# Patient Record
Sex: MALE
[De-identification: names, ages, dates, MRNs, and addresses within clinical notes are randomized per-mention and may not be internally consistent; named-entity substitution may affect disease eponyms.]

---

## 2022-04-28 ENCOUNTER — APPOINTMENT (OUTPATIENT)
Dept: ORTHOPEDIC SURGERY | Facility: CLINIC | Age: 67
End: 2022-04-28
Payer: MEDICARE

## 2022-04-28 VITALS — BODY MASS INDEX: 28.61 KG/M2 | HEIGHT: 66 IN | WEIGHT: 178 LBS

## 2022-04-28 DIAGNOSIS — M17.11 UNILATERAL PRIMARY OSTEOARTHRITIS, RIGHT KNEE: ICD-10-CM

## 2022-04-28 DIAGNOSIS — M17.12 UNILATERAL PRIMARY OSTEOARTHRITIS, LEFT KNEE: ICD-10-CM

## 2022-04-28 PROBLEM — Z00.00 ENCOUNTER FOR PREVENTIVE HEALTH EXAMINATION: Status: ACTIVE | Noted: 2022-04-28

## 2022-04-28 PROCEDURE — 99213 OFFICE O/P EST LOW 20 MIN: CPT | Mod: 25

## 2022-04-28 PROCEDURE — 20610 DRAIN/INJ JOINT/BURSA W/O US: CPT | Mod: 50

## 2022-04-28 PROCEDURE — 73562 X-RAY EXAM OF KNEE 3: CPT | Mod: RT

## 2022-04-28 PROCEDURE — 99203 OFFICE O/P NEW LOW 30 MIN: CPT | Mod: 25

## 2022-04-28 NOTE — HISTORY OF PRESENT ILLNESS
[Gradual] : gradual [Dull/Aching] : dull/aching [Constant] : constant [Sleep] : sleep [de-identified] : Has soreness and stiffness both knees for long period of time. Gets some clicking, no locking or giving way. No joint swelling. Has h/o renal failure on dialysis [] : no [FreeTextEntry3] : 4/25/22 [FreeTextEntry5] : Patient started to feel really bad pain in the knees that has been getting worse [FreeTextEntry9] : hemp oil

## 2022-04-28 NOTE — PHYSICAL EXAM
[5___] : hamstring 5[unfilled]/5 [Negative] : negative Shaan's [Bilateral] : knee bilaterally [AP] : anteroposterior [Lateral] : lateral [Degenerative change] : Degenerative change [] : no effusion [FreeTextEntry9] : mild medial [TWNoteComboBox7] : flexion 120 degrees [de-identified] : extension 3 degrees

## 2022-04-28 NOTE — PROCEDURE
[Large Joint Injection] : Large joint injection [Bilateral] : bilaterally of the [Knee] : knee [Pain] : pain [Alcohol] : alcohol [Betadine] : betadine [Ethyl Chloride sprayed topically] : ethyl chloride sprayed topically [___ cc    3mg] :  Betamethasone ([place]) ~Vcc of 3mg [___ cc    1%] : Lidocaine ~Vcc of 1%

## 2024-04-09 ENCOUNTER — OFFICE (OUTPATIENT)
Dept: URBAN - METROPOLITAN AREA CLINIC 6 | Facility: CLINIC | Age: 69
Setting detail: OPHTHALMOLOGY
End: 2024-04-09
Payer: MEDICARE

## 2024-04-09 DIAGNOSIS — H43.813: ICD-10-CM

## 2024-04-09 DIAGNOSIS — H25.13: ICD-10-CM

## 2024-04-09 PROBLEM — H52.7 REFRACTIVE ERROR: Status: ACTIVE | Noted: 2024-04-09

## 2024-04-09 PROCEDURE — 92004 COMPRE OPH EXAM NEW PT 1/>: CPT | Performed by: OPHTHALMOLOGY

## 2025-04-22 ENCOUNTER — OFFICE (OUTPATIENT)
Dept: URBAN - METROPOLITAN AREA CLINIC 6 | Facility: CLINIC | Age: 70
Setting detail: OPHTHALMOLOGY
End: 2025-04-22
Payer: MEDICARE

## 2025-04-22 DIAGNOSIS — H43.813: ICD-10-CM

## 2025-04-22 DIAGNOSIS — H25.13: ICD-10-CM

## 2025-04-22 PROCEDURE — 92014 COMPRE OPH EXAM EST PT 1/>: CPT | Performed by: OPHTHALMOLOGY

## 2025-04-22 ASSESSMENT — KERATOMETRY
OD_AXISANGLE_DEGREES: 003
OS_K1POWER_DIOPTERS: 42.50
OD_K1POWER_DIOPTERS: 42.50
OS_K2POWER_DIOPTERS: 44.75
OS_AXISANGLE_DEGREES: 176
OD_K2POWER_DIOPTERS: 44.50

## 2025-04-22 ASSESSMENT — REFRACTION_MANIFEST
OD_CYLINDER: -2.25
OS_AXIS: 080
OS_VA1: 20/30-
OD_VA1: 20/25-2
OS_SPHERE: +3.25
OS_CYLINDER: -1.75
OD_SPHERE: +3.00
OU_VA: 20/25-
OD_AXIS: 095
OS_CYLINDER: -1.75
OU_VA: 20/25-
OD_ADD: +3.00
OS_VA1: 20/30-
OD_AXIS: 095
OD_SPHERE: +3.00
OD_CYLINDER: -2.25
OD_VA1: 20/25-2
OS_SPHERE: +3.25
OS_AXIS: 080
OS_ADD: +3.00

## 2025-04-22 ASSESSMENT — REFRACTION_CURRENTRX
OD_CYLINDER: -1.50
OS_CYLINDER: -2.25
OD_VPRISM_DIRECTION: PROGS
OS_ADD: +3.00
OS_OVR_VA: 20/
OS_VPRISM_DIRECTION: PROGS
OD_OVR_VA: 20/
OS_SPHERE: +3.25
OD_ADD: +3.00
OS_AXIS: 063
OD_SPHERE: +2.75
OD_AXIS: 092

## 2025-04-22 ASSESSMENT — REFRACTION_AUTOREFRACTION
OD_AXIS: 093
OS_SPHERE: +3.50
OS_AXIS: 077
OD_SPHERE: +2.75
OS_CYLINDER: -2.00
OD_CYLINDER: -1.75

## 2025-04-22 ASSESSMENT — VISUAL ACUITY
OS_BCVA: 20/30-1
OD_BCVA: 20/40-1

## 2025-04-22 ASSESSMENT — TONOMETRY
OS_IOP_MMHG: 19
OD_IOP_MMHG: 18

## 2025-04-29 ENCOUNTER — OFFICE (OUTPATIENT)
Dept: URBAN - METROPOLITAN AREA CLINIC 6 | Facility: CLINIC | Age: 70
Setting detail: OPHTHALMOLOGY
End: 2025-04-29
Payer: MEDICARE

## 2025-04-29 DIAGNOSIS — H25.011: ICD-10-CM

## 2025-04-29 DIAGNOSIS — H43.393: ICD-10-CM

## 2025-04-29 DIAGNOSIS — H35.371: ICD-10-CM

## 2025-04-29 DIAGNOSIS — H25.13: ICD-10-CM

## 2025-04-29 DIAGNOSIS — H43.813: ICD-10-CM

## 2025-04-29 PROCEDURE — 99214 OFFICE O/P EST MOD 30 MIN: CPT | Performed by: OPHTHALMOLOGY

## 2025-04-29 PROCEDURE — 92134 CPTRZ OPH DX IMG PST SGM RTA: CPT | Performed by: OPHTHALMOLOGY

## 2025-04-29 PROCEDURE — 92201 OPSCPY EXTND RTA DRAW UNI/BI: CPT | Performed by: OPHTHALMOLOGY

## 2025-04-29 ASSESSMENT — REFRACTION_CURRENTRX
OD_VPRISM_DIRECTION: PROGS
OD_AXIS: 087
OS_SPHERE: +2.50
OD_ADD: +3.00
OD_CYLINDER: -1.50
OS_ADD: +3.00
OS_AXIS: 084
OS_OVR_VA: 20/
OS_CYLINDER: -1.75
OD_OVR_VA: 20/
OD_SPHERE: +3.00
OS_VPRISM_DIRECTION: PROGS

## 2025-04-29 ASSESSMENT — REFRACTION_AUTOREFRACTION
OD_CYLINDER: -2.00
OS_SPHERE: +3.50
OD_SPHERE: +2.75
OS_CYLINDER: -2.25
OD_AXIS: 092
OS_AXIS: 076

## 2025-04-29 ASSESSMENT — KERATOMETRY
OD_AXISANGLE_DEGREES: 004
OS_K1POWER_DIOPTERS: 43.25
OD_K1POWER_DIOPTERS: 42.75
OD_K2POWER_DIOPTERS: 44.75
OS_K2POWER_DIOPTERS: 44.50
OS_AXISANGLE_DEGREES: 178

## 2025-04-29 ASSESSMENT — CONFRONTATIONAL VISUAL FIELD TEST (CVF)
OS_FINDINGS: FULL
OD_FINDINGS: FULL

## 2025-04-29 ASSESSMENT — REFRACTION_MANIFEST
OS_SPHERE: +3.50
OS_AXIS: 080
OD_AXIS: 090
OD_CYLINDER: -2.00
OS_SPHERE: +3.25
OD_SPHERE: +2.75
OD_VA1: 20/30
OD_AXIS: 095
OS_AXIS: 075
OS_CYLINDER: -1.75
OU_VA: 20/30
OD_SPHERE: +3.00
OS_ADD: +3.00
OS_VA1: 20/30-
OS_VA1: 20/40
OD_VA1: 20/25-2
OD_ADD: +3.00
OD_CYLINDER: -2.25
OS_CYLINDER: -2.25
OU_VA: 20/25-

## 2025-04-29 ASSESSMENT — TONOMETRY
OS_IOP_MMHG: 16
OD_IOP_MMHG: 19

## 2025-04-29 ASSESSMENT — VISUAL ACUITY
OS_BCVA: 20/50+2
OD_BCVA: 20/40

## 2025-06-14 ENCOUNTER — OFFICE (OUTPATIENT)
Dept: URBAN - METROPOLITAN AREA CLINIC 104 | Facility: CLINIC | Age: 70
Setting detail: OPHTHALMOLOGY
End: 2025-06-14
Payer: MEDICARE

## 2025-06-14 DIAGNOSIS — Z01.818: ICD-10-CM

## 2025-06-14 DIAGNOSIS — H25.13: ICD-10-CM

## 2025-06-14 PROCEDURE — 99212 OFFICE O/P EST SF 10 MIN: CPT

## 2025-06-17 ENCOUNTER — OFFICE (OUTPATIENT)
Dept: URBAN - METROPOLITAN AREA CLINIC 6 | Facility: CLINIC | Age: 70
Setting detail: OPHTHALMOLOGY
End: 2025-06-17
Payer: MEDICARE

## 2025-06-17 DIAGNOSIS — H25.13: ICD-10-CM

## 2025-06-17 DIAGNOSIS — H25.11: ICD-10-CM

## 2025-06-17 PROBLEM — Z01.818 ENCOUNTER FOR OTHER PREPROCEDURAL EXAMINATION: Status: ACTIVE | Noted: 2025-06-14

## 2025-06-17 PROCEDURE — 92136 OPHTHALMIC BIOMETRY: CPT | Mod: TC | Performed by: OPHTHALMOLOGY

## 2025-06-17 PROCEDURE — 92136 OPHTHALMIC BIOMETRY: CPT | Mod: 26,RT | Performed by: OPHTHALMOLOGY

## 2025-06-17 PROCEDURE — 99213 OFFICE O/P EST LOW 20 MIN: CPT | Performed by: OPHTHALMOLOGY

## 2025-06-17 ASSESSMENT — KERATOMETRY
OS_K1POWER_DIOPTERS: 43.00
OS_K2POWER_DIOPTERS: 44.75
OD_K1POWER_DIOPTERS: 42.50
OS_AXISANGLE_DEGREES: 005
OD_K2POWER_DIOPTERS: 44.75
OD_AXISANGLE_DEGREES: 006

## 2025-06-17 ASSESSMENT — REFRACTION_AUTOREFRACTION
OS_CYLINDER: -2.00
OD_AXIS: 092
OD_SPHERE: +2.75
OS_SPHERE: +3.00
OS_AXIS: 078
OD_CYLINDER: -2.00

## 2025-06-17 ASSESSMENT — CONFRONTATIONAL VISUAL FIELD TEST (CVF)
OD_FINDINGS: FULL
OS_FINDINGS: FULL

## 2025-06-17 ASSESSMENT — VISUAL ACUITY
OS_BCVA: 20/50+2
OD_BCVA: 20/40+2

## 2025-06-17 ASSESSMENT — TONOMETRY
OS_IOP_MMHG: 17
OD_IOP_MMHG: 16

## 2025-07-10 ENCOUNTER — ASC (OUTPATIENT)
Dept: URBAN - METROPOLITAN AREA SURGERY 8 | Facility: SURGERY | Age: 70
Setting detail: OPHTHALMOLOGY
End: 2025-07-10
Payer: MEDICARE

## 2025-07-10 DIAGNOSIS — H52.221: ICD-10-CM

## 2025-07-10 DIAGNOSIS — H25.11: ICD-10-CM

## 2025-07-10 PROCEDURE — 66984 XCAPSL CTRC RMVL W/O ECP: CPT | Mod: RT | Performed by: OPHTHALMOLOGY

## 2025-07-10 PROCEDURE — FEMTO PRECISION LASER CATARACT SURGERY: Mod: GY | Performed by: OPHTHALMOLOGY

## 2025-07-10 PROCEDURE — V2787 ASTIGMATISM-CORRECT FUNCTION: HCPCS | Performed by: OPHTHALMOLOGY

## 2025-07-10 PROCEDURE — S9986 NOT MEDICALLY NECESSARY SVC: HCPCS | Mod: GX,GY | Performed by: OPHTHALMOLOGY

## 2025-07-10 PROCEDURE — 68841 INSJ RX ELUT IMPLT LAC CANAL: CPT | Mod: RT | Performed by: OPHTHALMOLOGY

## 2025-07-11 ENCOUNTER — OFFICE (OUTPATIENT)
Dept: URBAN - METROPOLITAN AREA CLINIC 6 | Facility: CLINIC | Age: 70
Setting detail: OPHTHALMOLOGY
End: 2025-07-11
Payer: MEDICARE

## 2025-07-11 ENCOUNTER — RX ONLY (RX ONLY)
Age: 70
End: 2025-07-11

## 2025-07-11 DIAGNOSIS — Z96.1: ICD-10-CM

## 2025-07-11 PROCEDURE — 99024 POSTOP FOLLOW-UP VISIT: CPT

## 2025-07-11 ASSESSMENT — REFRACTION_AUTOREFRACTION
OD_SPHERE: +1.00
OS_CYLINDER: -2.00
OD_AXIS: 047
OS_SPHERE: +3.75
OS_AXIS: 075
OD_CYLINDER: -1.00

## 2025-07-11 ASSESSMENT — KERATOMETRY
OS_K2POWER_DIOPTERS: 44.75
OD_AXISANGLE_DEGREES: 173
OD_K2POWER_DIOPTERS: 4.25
OS_AXISANGLE_DEGREES: 001
OS_K1POWER_DIOPTERS: 43.00
OD_K1POWER_DIOPTERS: 42.50

## 2025-07-11 ASSESSMENT — TONOMETRY: OS_IOP_MMHG: 20

## 2025-07-11 ASSESSMENT — VISUAL ACUITY
OS_BCVA: 20/30
OD_BCVA: 20/70-

## 2025-07-16 ENCOUNTER — RX ONLY (RX ONLY)
Age: 70
End: 2025-07-16

## 2025-07-16 ENCOUNTER — OFFICE (OUTPATIENT)
Dept: URBAN - METROPOLITAN AREA CLINIC 6 | Facility: CLINIC | Age: 70
Setting detail: OPHTHALMOLOGY
End: 2025-07-16
Payer: MEDICARE

## 2025-07-16 DIAGNOSIS — H25.12: ICD-10-CM

## 2025-07-16 PROBLEM — Z96.1 PSEUDOPHAKIA ; RIGHT EYE: Status: ACTIVE | Noted: 2025-07-11

## 2025-07-16 PROCEDURE — 92136 OPHTHALMIC BIOMETRY: CPT | Mod: 26,LT | Performed by: OPHTHALMOLOGY

## 2025-07-16 ASSESSMENT — KERATOMETRY
OS_AXISANGLE_DEGREES: 002
OD_K2POWER_DIOPTERS: 44.50
OS_K2POWER_DIOPTERS: 44.75
OS_K1POWER_DIOPTERS: 42.75
OD_AXISANGLE_DEGREES: 176
OD_K1POWER_DIOPTERS: 42.75

## 2025-07-16 ASSESSMENT — CONFRONTATIONAL VISUAL FIELD TEST (CVF)
OD_FINDINGS: FULL
OS_FINDINGS: FULL

## 2025-07-16 ASSESSMENT — REFRACTION_AUTOREFRACTION
OD_CYLINDER: -0.75
OS_AXIS: 077
OS_CYLINDER: -1.75
OS_SPHERE: +3.25
OD_SPHERE: +1.00
OD_AXIS: 073

## 2025-07-16 ASSESSMENT — VISUAL ACUITY
OD_BCVA: 20/100
OS_BCVA: 20/30

## 2025-07-16 ASSESSMENT — TONOMETRY
OS_IOP_MMHG: 16
OD_IOP_MMHG: 16

## 2025-08-07 ENCOUNTER — ASC (OUTPATIENT)
Dept: URBAN - METROPOLITAN AREA SURGERY 8 | Facility: SURGERY | Age: 70
Setting detail: OPHTHALMOLOGY
End: 2025-08-07
Payer: MEDICARE

## 2025-08-07 DIAGNOSIS — H52.222: ICD-10-CM

## 2025-08-07 DIAGNOSIS — H25.12: ICD-10-CM

## 2025-08-07 PROCEDURE — 68841 INSJ RX ELUT IMPLT LAC CANAL: CPT | Mod: 79,LT | Performed by: OPHTHALMOLOGY

## 2025-08-07 PROCEDURE — V2787 ASTIGMATISM-CORRECT FUNCTION: HCPCS | Performed by: OPHTHALMOLOGY

## 2025-08-07 PROCEDURE — S9986 NOT MEDICALLY NECESSARY SVC: HCPCS | Mod: GX,GY | Performed by: OPHTHALMOLOGY

## 2025-08-07 PROCEDURE — 66984 XCAPSL CTRC RMVL W/O ECP: CPT | Mod: 79,LT | Performed by: OPHTHALMOLOGY

## 2025-08-07 PROCEDURE — FEMTO PRECISION LASER CATARACT SURGERY: Mod: GY | Performed by: OPHTHALMOLOGY

## 2025-08-08 ENCOUNTER — OFFICE (OUTPATIENT)
Dept: URBAN - METROPOLITAN AREA CLINIC 1 | Facility: CLINIC | Age: 70
Setting detail: OPHTHALMOLOGY
End: 2025-08-08
Payer: MEDICARE

## 2025-08-08 DIAGNOSIS — Z96.1: ICD-10-CM

## 2025-08-08 PROCEDURE — 99024 POSTOP FOLLOW-UP VISIT: CPT

## 2025-08-08 ASSESSMENT — TONOMETRY
OS_IOP_MMHG: 21
OD_IOP_MMHG: 12
OS_IOP_MMHG: 20

## 2025-08-08 ASSESSMENT — VISUAL ACUITY
OD_BCVA: 20/30-2
OS_BCVA: 20/20-3

## 2025-08-08 ASSESSMENT — REFRACTION_AUTOREFRACTION
OD_CYLINDER: -0.50
OS_AXIS: 092
OD_SPHERE: +0.50
OS_CYLINDER: -0.75
OD_AXIS: 046
OS_SPHERE: +1.25

## 2025-08-08 ASSESSMENT — KERATOMETRY
OD_K2POWER_DIOPTERS: 44.50
OS_K2POWER_DIOPTERS: 44.50
OS_K1POWER_DIOPTERS: 43.00
OD_AXISANGLE_DEGREES: 001
OS_AXISANGLE_DEGREES: 002
OD_K1POWER_DIOPTERS: 43.00

## 2025-08-08 ASSESSMENT — CONFRONTATIONAL VISUAL FIELD TEST (CVF)
OD_FINDINGS: FULL
OS_FINDINGS: FULL

## 2025-08-13 ENCOUNTER — OFFICE (OUTPATIENT)
Dept: URBAN - METROPOLITAN AREA CLINIC 6 | Facility: CLINIC | Age: 70
Setting detail: OPHTHALMOLOGY
End: 2025-08-13
Payer: MEDICARE

## 2025-08-13 DIAGNOSIS — Z96.1: ICD-10-CM

## 2025-08-13 PROCEDURE — 99024 POSTOP FOLLOW-UP VISIT: CPT

## 2025-08-13 ASSESSMENT — REFRACTION_AUTOREFRACTION
OS_SPHERE: +1.00
OD_CYLINDER: -0.50
OD_AXIS: 042
OD_SPHERE: +0.75
OS_CYLINDER: -0.75
OS_AXIS: 080

## 2025-08-13 ASSESSMENT — KERATOMETRY
OS_K1POWER_DIOPTERS: 42.50
OD_AXISANGLE_DEGREES: 003
OS_K2POWER_DIOPTERS: 44.50
OS_AXISANGLE_DEGREES: 174
OD_K1POWER_DIOPTERS: 43.00
OD_K2POWER_DIOPTERS: 44.50

## 2025-08-13 ASSESSMENT — TONOMETRY
OS_IOP_MMHG: 15
OD_IOP_MMHG: 13

## 2025-08-13 ASSESSMENT — VISUAL ACUITY
OS_BCVA: 20/20-3
OD_BCVA: 20/30+2

## 2025-08-13 ASSESSMENT — CONFRONTATIONAL VISUAL FIELD TEST (CVF)
OS_FINDINGS: FULL
OD_FINDINGS: FULL